# Patient Record
(demographics unavailable — no encounter records)

---

## 2024-11-05 NOTE — PHYSICAL EXAM
[Left] : left foot and ankle [2+] : dorsalis pedis pulse: 2+ [Decreased] : saphenous nerve sensation decreased [] : patient ambulates without assistive device [FreeTextEntry3] : ant shin [TWNoteComboBox7] : dorsiflexion 15 degrees

## 2024-11-05 NOTE — HISTORY OF PRESENT ILLNESS
[4] : 4 [0] : 0 [Intermittent] : intermittent [Leisure] : leisure [Rest] : rest [Standing] : standing [de-identified] : 6/14/22: Pt is a 52 year old F who presents today for evaluation of their left foot. Pain to the lateral foot and medial foot; feels "pulling sensation" shoot up the leg with ambulation. Pt states that when she was 18yo she was stepped on and fx'd her sesamoid and needed 2 surgeries (sesamoidectomy). H/o nerve block injections in her spine. Pt still feels as though shes not walking correctly (heel to medial foot) which is causing other issues. Hx CMT.  Denies recent trauma. No formal treatment to date. WB in sneakers. Had therapy.  11/8/22: F/U L foot- Pt is here for pre op discussion.  1/24/23: L foot: PT /HEP; considering surgery on the forefoot.  Achilles/plantar fascia tight  2/3/23: Revision Akin bunionectomy; Distal interphalangeal arthroplasty 4th; Extensor tenotomy, percutaneous 4th; 5th metatarsal ostectomy; Extensor digitorum longus lengthening/repair; Extensor digitorum brevis tenotomy; Metatarsophalangeal arthroplasty.  2/14/23: 1st Post Op- NWB in splint using crutches and post op shoe. Doing well, pain is manageable, some swelling. Denies fever, chills, N/V.  3/2/23  f/u L foot; NWB with post op shoe and crutches. pain controlled 4/11/23: F/U L foot- WBAT in supportive shoes. PT/HEP is helping. Concern with balance and big toe is shifting medially. Also concern with swelling around the big toe and top of the foot. Occurs when she goes to PT.  05/23/23: F/U L Foot finished PT  Considering cavus recon late summer 08/01/2023: Pre-op L cavus correction  8/4/23: calc osteotomy, achilles tendon lengthening  08/15/23: F/U L foot. 1st post op visit.  NWB in splint 09/12/23: Post-op L foot. NWB with SLC and crutches. Recent scabies infection 2 weeks ago recently treated. Tingling to the foot  10/24/23: follow up on left foot. Needs a new Pt script 12/12/23: follow up on left foot. states foot is still swelling. PT 2x a week.  01/30/24: follow up on left achilles. states she has been having swelling and numbness. Stopped PT end of December. Still with difficulty bending the toes due to swelling.  There is still numbness. Hx Osteomyelitis to spine L4-L5 as a child 02/27/24: Here for EMG Results  Family hx of CMT  Had pain mg NJ SI injection w/relief in past 04/09/24: here for MRi Results LS bETTER W/pt 05/28/24: follow up on left foot. states she feels tightness and pain along the plantar foot. states she feels like the big toe is shifting. WB in sneakers. PT stopped.   08/01/2024: Patient is here for MRI results on left foot.  11/05/2024 NI L foot and ankle Patient state she fell 11/02 down the stairs and twisted her ankle.  [] : no [FreeTextEntry1] : L foot [FreeTextEntry6] : Soreness [de-identified] : 2/3/23 [de-identified] : Revision Akin bunionectomy; Distal interphalangeal arthroplasty 4th; Extensive tenotomy, percutaneous 4th; 5th metatarsal ostectomy; Extensor digitorum longus lengthening/repair; Extensor digitorum brevis tenotomy; Metatarsophalangeal arthroplasty

## 2024-11-05 NOTE — IMAGING
[Weight -] : weightbearing [There are no fractures, subluxations or dislocations. No significant abnormalities are seen] : There are no fractures, subluxations or dislocations. No significant abnormalities are seen [Left] : left foot [No loss of surgical correlation. Bony alignment acceptable. Hardware in appropriate position] : No loss of surgical correlation. Bony alignment acceptable. Hardware in appropriate position

## 2025-03-11 NOTE — PHYSICAL EXAM
[Left] : left foot and ankle [2+] : dorsalis pedis pulse: 2+ [Decreased] : saphenous nerve sensation decreased [] : toe tenderness [1st] : 1st [2nd] : 2nd [TWNoteComboBox7] : dorsiflexion 15 degrees

## 2025-03-11 NOTE — DISCUSSION/SUMMARY
[Surgical risks reviewed] : Surgical risks reviewed [de-identified] : Discussed excision fibromas and risk or recurrence.  Patient would like to have Cosmo and 2nd hammertoe correction at the [facility] setting  The risks, benefits, alternatives have been discussed.  The risks include but are not limited to infection, bleeding, injury to small nerves and blood vessels, pain, stiffness, progression, dvt, PE, amputation and death.

## 2025-03-11 NOTE — HISTORY OF PRESENT ILLNESS
[4] : 4 [0] : 0 [Intermittent] : intermittent [Leisure] : leisure [Rest] : rest [Standing] : standing [de-identified] : 6/14/22: Pt is a 52 year old F who presents today for evaluation of their left foot. Pain to the lateral foot and medial foot; feels "pulling sensation" shoot up the leg with ambulation. Pt states that when she was 20yo she was stepped on and fx'd her sesamoid and needed 2 surgeries (sesamoidectomy). H/o nerve block injections in her spine. Pt still feels as though shes not walking correctly (heel to medial foot) which is causing other issues. Hx CMT.  Denies recent trauma. No formal treatment to date. WB in sneakers. Had therapy.  11/8/22: F/U L foot- Pt is here for pre op discussion.  1/24/23: L foot: PT /HEP; considering surgery on the forefoot.  Achilles/plantar fascia tight  2/3/23: Revision Akin bunionectomy; Distal interphalangeal arthroplasty 4th; Extensor tenotomy, percutaneous 4th; 5th metatarsal ostectomy; Extensor digitorum longus lengthening/repair; Extensor digitorum brevis tenotomy; Metatarsophalangeal arthroplasty.  2/14/23: 1st Post Op- NWB in splint using crutches and post op shoe. Doing well, pain is manageable, some swelling. Denies fever, chills, N/V.  3/2/23  f/u L foot; NWB with post op shoe and crutches. pain controlled 4/11/23: F/U L foot- WBAT in supportive shoes. PT/HEP is helping. Concern with balance and big toe is shifting medially. Also concern with swelling around the big toe and top of the foot. Occurs when she goes to PT.  05/23/23: F/U L Foot finished PT  Considering cavus recon late summer 08/01/2023: Pre-op L cavus correction  8/4/23: calc osteotomy, achilles tendon lengthening  08/15/23: F/U L foot. 1st post op visit.  NWB in splint 09/12/23: Post-op L foot. NWB with SLC and crutches. Recent scabies infection 2 weeks ago recently treated. Tingling to the foot  10/24/23: follow up on left foot. Needs a new Pt script 12/12/23: follow up on left foot. states foot is still swelling. PT 2x a week.  01/30/24: follow up on left achilles. states she has been having swelling and numbness. Stopped PT end of December. Still with difficulty bending the toes due to swelling.  There is still numbness. Hx Osteomyelitis to spine L4-L5 as a child 02/27/24: Here for EMG Results  Family hx of CMT  Had pain mg NJ SI injection w/relief in past 04/09/24: here for MRi Results LS bETTER W/pt 05/28/24: follow up on left foot. states she feels tightness and pain along the plantar foot. states she feels like the big toe is shifting. WB in sneakers. PT stopped.   08/01/2024: Patient is here for MRI results on left foot.  11/05/2024 NI L foot and ankle Patient state she fell 11/02 down the stairs and twisted her ankle.  03.11.25 Patient is following up on the left foot. She states the foot is swollen. She wants to discuss SX for fibromas and hallux/2nd toe. [] : no [FreeTextEntry1] : L foot [FreeTextEntry6] : Soreness [de-identified] : 2/3/23 [de-identified] : Revision Akin bunionectomy; Distal interphalangeal arthroplasty 4th; Extensive tenotomy, percutaneous 4th; 5th metatarsal ostectomy; Extensor digitorum longus lengthening/repair; Extensor digitorum brevis tenotomy; Metatarsophalangeal arthroplasty

## 2025-04-14 NOTE — IMAGING
[Right] : right shoulder [There are no fractures, subluxations or dislocations. No significant abnormalities are seen] : There are no fractures, subluxations or dislocations. No significant abnormalities are seen [Type 2 acromion] : Type 2 acromion [de-identified] :   ----------------------------------------------------------------------------   Left shoulder exam:    Skin: no significant pertinent finding  Inspection: no obvious deformity, no obvious masses, no swelling, no effusion, no atrophy  ROM:     FF: 180     ER: 70     IR: T12  Tenderness:+right parascapular, +right thoracic paraspinal     (+) Anterior/Biceps:     (neg) Posterior     (neg) Lateral     (+) Trapezius     (+) Scapula     (neg) AC joint     (neg) Crepitus with ROM  Stability:     (neg) Translation     (neg) Apprehension     (neg) Clicking  Additional tests:     (+) Neer's     (+) Hawkin's     (neg) Roman's     (+) Speed     (neg) Cross chest adduction  Strength:     FF: 5/5     ER: 5/5     IR: 5/5     Biceps: 5/5     Triceps: 5/5     Distal: 5/5  Neuro: In tact to light touch throughout  Vascularity: Extremity warm and well perfused   [FreeTextEntry1] : C5-6, 6-7 mild ddd

## 2025-04-14 NOTE — HISTORY OF PRESENT ILLNESS
[de-identified] : 04/14/2025  :WILLIE RUBIO a55 year old female, presents today for right shoulder, patient stated pain began 04/09/25, possible injury during workout. stated pain shoots down to hands. no numbness or tingling. had CSI injection in past did have relief. attends Chiropractor. pinch sensation.   right-handed   shoulder surgery 2011 Dr Katherine BREWER/ [facility]

## 2025-04-14 NOTE — DISCUSSION/SUMMARY
[Medication Risks Reviewed] : Medication risks reviewed [de-identified] : Discussed options, MDP cyclobenzaprine PT rx f/u 6 weeks ----------------------------------------------------------------------------   All relevant imaging studies pertinent to today's visit, including x-rays, MRI's and/or other advanced imaging studies (CT/etc) were independently interpreted and reviewed with the patient as needed. Implications of the studies together with the patient's clinical picture were discussed to formulate a working diagnosis and management options were detailed.   The patient and/or guardian was advised of the diagnosis.  The natural history of the pathology was explained in full. All questions were answered.  The risks and benefits of conservative and interventional treatment alternatives were explained to the patient   The patient and/or guardian was advised if any advanced diagnostic/imaging study (MRI/CT/etc) is ordered to evaluate potential pathology in the affected area(s), they should follow up in the office to review the results of the study and determine further management that may be indicated.